# Patient Record
Sex: FEMALE | Race: WHITE | NOT HISPANIC OR LATINO | Employment: PART TIME | URBAN - METROPOLITAN AREA
[De-identification: names, ages, dates, MRNs, and addresses within clinical notes are randomized per-mention and may not be internally consistent; named-entity substitution may affect disease eponyms.]

---

## 2022-10-18 ENCOUNTER — OFFICE VISIT (OUTPATIENT)
Dept: UROLOGY | Facility: CLINIC | Age: 77
End: 2022-10-18
Payer: MEDICARE

## 2022-10-18 VITALS
SYSTOLIC BLOOD PRESSURE: 120 MMHG | HEIGHT: 62 IN | WEIGHT: 142.44 LBS | BODY MASS INDEX: 26.21 KG/M2 | DIASTOLIC BLOOD PRESSURE: 70 MMHG

## 2022-10-18 DIAGNOSIS — N95.8 GENITOURINARY SYNDROME OF MENOPAUSE: ICD-10-CM

## 2022-10-18 DIAGNOSIS — N81.10 BADEN-WALKER GRADE 2 CYSTOCELE: ICD-10-CM

## 2022-10-18 DIAGNOSIS — R33.9 URINARY RETENTION: ICD-10-CM

## 2022-10-18 DIAGNOSIS — N39.0 RECURRENT UTI: Primary | ICD-10-CM

## 2022-10-18 PROCEDURE — 99204 PR OFFICE/OUTPT VISIT, NEW, LEVL IV, 45-59 MIN: ICD-10-PCS | Mod: S$GLB,,, | Performed by: UROLOGY

## 2022-10-18 PROCEDURE — 1159F PR MEDICATION LIST DOCUMENTED IN MEDICAL RECORD: ICD-10-PCS | Mod: CPTII,S$GLB,, | Performed by: UROLOGY

## 2022-10-18 PROCEDURE — 3074F PR MOST RECENT SYSTOLIC BLOOD PRESSURE < 130 MM HG: ICD-10-PCS | Mod: CPTII,S$GLB,, | Performed by: UROLOGY

## 2022-10-18 PROCEDURE — 3288F FALL RISK ASSESSMENT DOCD: CPT | Mod: CPTII,S$GLB,, | Performed by: UROLOGY

## 2022-10-18 PROCEDURE — 87086 URINE CULTURE/COLONY COUNT: CPT | Performed by: UROLOGY

## 2022-10-18 PROCEDURE — 87077 CULTURE AEROBIC IDENTIFY: CPT | Performed by: UROLOGY

## 2022-10-18 PROCEDURE — 99999 PR PBB SHADOW E&M-EST. PATIENT-LVL III: CPT | Mod: PBBFAC,,, | Performed by: UROLOGY

## 2022-10-18 PROCEDURE — 3074F SYST BP LT 130 MM HG: CPT | Mod: CPTII,S$GLB,, | Performed by: UROLOGY

## 2022-10-18 PROCEDURE — 3288F PR FALLS RISK ASSESSMENT DOCUMENTED: ICD-10-PCS | Mod: CPTII,S$GLB,, | Performed by: UROLOGY

## 2022-10-18 PROCEDURE — 1101F PT FALLS ASSESS-DOCD LE1/YR: CPT | Mod: CPTII,S$GLB,, | Performed by: UROLOGY

## 2022-10-18 PROCEDURE — 99999 PR PBB SHADOW E&M-EST. PATIENT-LVL III: ICD-10-PCS | Mod: PBBFAC,,, | Performed by: UROLOGY

## 2022-10-18 PROCEDURE — 51798 US URINE CAPACITY MEASURE: CPT | Mod: S$GLB,,, | Performed by: UROLOGY

## 2022-10-18 PROCEDURE — 99204 OFFICE O/P NEW MOD 45 MIN: CPT | Mod: S$GLB,,, | Performed by: UROLOGY

## 2022-10-18 PROCEDURE — 1159F MED LIST DOCD IN RCRD: CPT | Mod: CPTII,S$GLB,, | Performed by: UROLOGY

## 2022-10-18 PROCEDURE — 1101F PR PT FALLS ASSESS DOC 0-1 FALLS W/OUT INJ PAST YR: ICD-10-PCS | Mod: CPTII,S$GLB,, | Performed by: UROLOGY

## 2022-10-18 PROCEDURE — 4010F ACE/ARB THERAPY RXD/TAKEN: CPT | Mod: CPTII,S$GLB,, | Performed by: UROLOGY

## 2022-10-18 PROCEDURE — 1126F AMNT PAIN NOTED NONE PRSNT: CPT | Mod: CPTII,S$GLB,, | Performed by: UROLOGY

## 2022-10-18 PROCEDURE — 87186 SC STD MICRODIL/AGAR DIL: CPT | Performed by: UROLOGY

## 2022-10-18 PROCEDURE — 3078F PR MOST RECENT DIASTOLIC BLOOD PRESSURE < 80 MM HG: ICD-10-PCS | Mod: CPTII,S$GLB,, | Performed by: UROLOGY

## 2022-10-18 PROCEDURE — 87088 URINE BACTERIA CULTURE: CPT | Performed by: UROLOGY

## 2022-10-18 PROCEDURE — 1126F PR PAIN SEVERITY QUANTIFIED, NO PAIN PRESENT: ICD-10-PCS | Mod: CPTII,S$GLB,, | Performed by: UROLOGY

## 2022-10-18 PROCEDURE — 4010F PR ACE/ARB THEARPY RXD/TAKEN: ICD-10-PCS | Mod: CPTII,S$GLB,, | Performed by: UROLOGY

## 2022-10-18 PROCEDURE — 51798 PR MEAS,POST-VOID RES,US,NON-IMAGING: ICD-10-PCS | Mod: S$GLB,,, | Performed by: UROLOGY

## 2022-10-18 PROCEDURE — 3078F DIAST BP <80 MM HG: CPT | Mod: CPTII,S$GLB,, | Performed by: UROLOGY

## 2022-10-18 RX ORDER — UBIDECARENONE 30 MG
30 CAPSULE ORAL 3 TIMES DAILY
COMMUNITY

## 2022-10-18 RX ORDER — GABAPENTIN 100 MG/1
100 CAPSULE ORAL
COMMUNITY
Start: 2022-09-30

## 2022-10-18 RX ORDER — LISINOPRIL 10 MG/1
1 TABLET ORAL DAILY
COMMUNITY
Start: 2022-09-29

## 2022-10-18 RX ORDER — LANOLIN ALCOHOL/MO/W.PET/CERES
1 CREAM (GRAM) TOPICAL DAILY
COMMUNITY
Start: 2022-05-05 | End: 2023-05-05

## 2022-10-18 RX ORDER — EZETIMIBE 10 MG/1
10 TABLET ORAL DAILY
COMMUNITY

## 2022-10-18 RX ORDER — SPIRONOLACTONE 100 MG/1
100 TABLET, FILM COATED ORAL
COMMUNITY

## 2022-10-18 RX ORDER — ASPIRIN 81 MG/1
81 TABLET ORAL
COMMUNITY

## 2022-10-18 RX ORDER — NITROFURANTOIN 25; 75 MG/1; MG/1
100 CAPSULE ORAL 2 TIMES DAILY
Qty: 14 CAPSULE | Refills: 0 | Status: SHIPPED | OUTPATIENT
Start: 2022-10-18

## 2022-10-18 NOTE — PROGRESS NOTES
Chief Complaint   Patient presents with    Other     New patient/recurrent uti's       History of Present Illness:   Tresa Dodd is a 76 y.o. female here for evaluation of Other (New patient/recurrent uti's)    She reports that she had a bladder lift years ago, vaginal approach. Unsure if mesh was involved.  When she sits in a car and has the urge to urinate, she reports it is painful. Nocturia Q45 minutes. 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 She reports feelings of pelvic pressure and knows that she has prolapse.   Has frequent UTIs. Urine is more cloudy lately.   Enterococcus on 2 cultures over the past 6 months.   Feels like she empties, but has frequency.   No dysuria. Reports one episode of gross hematuria, associated with a UTI 3 months ago. None otherwise.   She is sexually active.     Review of Systems   Constitutional:  Negative for chills and fever.   Respiratory:  Negative for shortness of breath.    Cardiovascular:  Negative for chest pain.   Genitourinary:  Negative for vaginal bleeding, vaginal discharge and vaginal pain.   All other systems reviewed and are negative.      Past Medical History:   Diagnosis Date    Hypercholesteremia        Past Surgical History:   Procedure Laterality Date    ANTERIOR VAGINAL REPAIR      HAND SURGERY      TONSILLECTOMY      TOTAL ABDOMINAL HYSTERECTOMY      Pelvic Prolapse    TUBAL LIGATION         Family History   Problem Relation Age of Onset    Diabetes Mother     Diabetes Brother        Social History     Tobacco Use    Smoking status: Never    Smokeless tobacco: Never       Current Outpatient Medications   Medication Sig Dispense Refill    aspirin (ECOTRIN) 81 MG EC tablet Take 81 mg by mouth.      calcium carbonate (CALCIUM 500 ORAL) Take 1 tablet by mouth once daily.      co-enzyme Q-10 30 mg capsule Take 30 mg by mouth 3 (three) times daily.      ezetimibe (ZETIA) 10 mg tablet Take 10 mg by mouth once daily.      gabapentin (NEURONTIN) 100 MG  capsule Take 100 mg by mouth.      lisinopriL 10 MG tablet Take 1 tablet by mouth once daily.      magnesium oxide (MAG-OX) 400 mg (241.3 mg magnesium) tablet Take 1 tablet by mouth once daily.      multivitamin capsule Take 1 capsule by mouth once daily.      spironolactone (ALDACTONE) 100 MG tablet Take 100 mg by mouth.      conjugated estrogens (PREMARIN) vaginal cream Apply a pea sized amount PV QHS x 2 weeks. Then, apply 3 times a week. 30 g 3    nitrofurantoin, macrocrystal-monohydrate, (MACROBID) 100 MG capsule Take 1 capsule (100 mg total) by mouth 2 (two) times daily. 14 capsule 0     No current facility-administered medications for this visit.       Review of patient's allergies indicates:  No Known Allergies    Physical Exam  Vitals:    10/18/22 0840   BP: 120/70     General: Well-developed, well-nourished, in no acute distress  HEENT: Normocephalic, atraumatic, extraocular movements intact  Neck: Supple, no supraclavicular or cervical lymphadenopathy, trachea midline  Respirations: even and unlabored  Back: midline spine, No CVA tenderness  Abdomen: soft, Non-tender, non-distended, no palpable masses, no rebound or guarding  : Normal external female genitalia without lesions. Orthotopic urethral meatus with caruncle. atrophic vaginal mucosa. Grade 2 Cysotcele, Grade 1 Rectocele, Grade 2 apical prolapse, negative cough stress test, + urethral hypermobility  Extremities: moves all equally, no clubbing, cyanosis or edema  Skin: Warm and dry. No lesions  Psych: normal affect  Neuro: Alert and oriented x 3. Cranial nerves II-XII intact    PVR: 181cc 10/18/22    Urinalysis  2+LE, pos nit, 50 blood    Assessment:  1. Recurrent UTI  Urine culture    US Retroperitoneal Complete (Kidney and      2. Genitourinary syndrome of menopause        3. Urinary retention        4. [place]-Walker grade 2 cystocele              Plan:   Recurrent UTI  -     Urine culture  -     US Retroperitoneal Complete (Kidney and; Future;  Expected date: 10/18/2022    Genitourinary syndrome of menopause    Urinary retention    [place]-Walker grade 2 cystocele    Other orders  -     nitrofurantoin, macrocrystal-monohydrate, (MACROBID) 100 MG capsule; Take 1 capsule (100 mg total) by mouth 2 (two) times daily.  Dispense: 14 capsule; Refill: 0  -     conjugated estrogens (PREMARIN) vaginal cream; Apply a pea sized amount PV QHS x 2 weeks. Then, apply 3 times a week.  Dispense: 30 g; Refill: 3    Recommended double voiding  Discussed further management options of prolapse, including PFPT, pessary and surgery. Pt would like to see how she does with current prescriptions.     Follow up in about 3 months (around 1/18/2023).

## 2022-10-20 LAB — BACTERIA UR CULT: ABNORMAL

## 2022-10-24 ENCOUNTER — HOSPITAL ENCOUNTER (OUTPATIENT)
Dept: RADIOLOGY | Facility: HOSPITAL | Age: 77
Discharge: HOME | End: 2022-10-24
Attending: UROLOGY
Payer: MEDICARE

## 2022-10-24 ENCOUNTER — TELEPHONE (OUTPATIENT)
Dept: UROLOGY | Facility: CLINIC | Age: 77
End: 2022-10-24

## 2022-10-24 DIAGNOSIS — N39.0 RECURRENT UTI: ICD-10-CM

## 2022-10-24 PROCEDURE — 76770 US EXAM ABDO BACK WALL COMP: CPT | Mod: TC,PN

## 2022-10-25 NOTE — TELEPHONE ENCOUNTER
----- Message from Rosamaria Akins sent at 10/24/2022  9:46 AM CDT -----  Regarding: Advice  Contact: Patient  Patient would like a call back concerning getting a cheaper estrogen cream, the one prescribed was to expensive, and would like to set up her bladder surgery. Please call to advise at Ph .[phone] (home)

## 2022-10-25 NOTE — TELEPHONE ENCOUNTER
----- Message from Apurva Cleary MD sent at 10/24/2022 10:39 AM CDT -----  Contact: Tresa  Okay, please schedule her for an office visit closer to the time that she wants surgery.   ----- Message -----  From: Damaris Ramos MA  Sent: 10/21/2022   4:49 PM CDT  To: Apurva Cleary MD    Is she referring to the sling?    ----- Message -----  From: Karen Heard  Sent: 10/21/2022  10:33 AM CDT  To: [facility]    Tresa called in to let the office know that she has decided to do Bladder tuck and she wants to schedule sometime after the 1st of the year. Please call her at [phone]    Thanks  Td

## 2022-10-27 ENCOUNTER — TELEPHONE (OUTPATIENT)
Dept: UROLOGY | Facility: CLINIC | Age: 77
End: 2022-10-27
Payer: MEDICARE

## 2022-10-27 NOTE — TELEPHONE ENCOUNTER
----- Message from Mari Tovar sent at 10/27/2022 10:19 AM CDT -----  Contact: [phone]  Patient called in regarding her fallen bladder. She would like to schedule an appointment after the holidays for that procedure. She is also calling to consult with a nurse regarding the Estrogen. It was very pricey and would like to know if there is something more affordable, samples, or anything to help her. Please call her back at [phone]. This patient has sent 2 prior messages regarding these matters. Thanks/MARYCHUY

## 2022-10-27 NOTE — TELEPHONE ENCOUNTER
----- Message from Mari Tovar sent at 10/27/2022 10:19 AM CDT -----  Contact: [phone]  Patient called in regarding her fallen bladder. She would like to schedule an appointment after the holidays for that procedure. She is also calling to consult with a nurse regarding the Estrogen. It was very pricey and would like to know if there is something more affordable, samples, or anything to help her. Please call her back at [phone]. This patient has sent 2 prior messages regarding these matters. Thanks/MARYCHUY

## 2022-10-27 NOTE — TELEPHONE ENCOUNTER
Called and spoke with pt states she needs to get a cheaper medication due to the vaginal cream being too expensive. If not she will get the expensive medication. Pt is interested in doing the bladder tuck around the 1st of the year whenever you are available. Pt also wanting to get results of her recent u/s.

## 2022-11-04 ENCOUNTER — TELEPHONE (OUTPATIENT)
Dept: UROLOGY | Facility: CLINIC | Age: 77
End: 2022-11-04
Payer: MEDICARE

## 2022-11-04 NOTE — TELEPHONE ENCOUNTER
Discussed vaginal estrogen cream with pt, who would like to purchase premain vaginal estrogen cream.

## 2023-01-23 PROBLEM — M85.80 OSTEOPENIA: Status: ACTIVE | Noted: 2023-01-23

## 2023-01-23 PROBLEM — N39.0 RECURRENT UTI: Status: RESOLVED | Noted: 2022-10-18 | Resolved: 2023-01-23

## 2023-01-23 PROBLEM — K57.92 DIVERTICULITIS: Status: ACTIVE | Noted: 2023-01-23

## 2023-01-24 ENCOUNTER — OFFICE VISIT (OUTPATIENT)
Dept: UROLOGY | Facility: CLINIC | Age: 78
End: 2023-01-24
Payer: MEDICARE

## 2023-01-24 VITALS
BODY MASS INDEX: 26.86 KG/M2 | SYSTOLIC BLOOD PRESSURE: 110 MMHG | WEIGHT: 145.94 LBS | DIASTOLIC BLOOD PRESSURE: 55 MMHG | HEART RATE: 61 BPM | HEIGHT: 62 IN

## 2023-01-24 DIAGNOSIS — R33.9 URINARY RETENTION: Primary | ICD-10-CM

## 2023-01-24 DIAGNOSIS — N81.10 BADEN-WALKER GRADE 2 CYSTOCELE: ICD-10-CM

## 2023-01-24 DIAGNOSIS — N95.8 GENITOURINARY SYNDROME OF MENOPAUSE: ICD-10-CM

## 2023-01-24 DIAGNOSIS — N39.0 RECURRENT UTI: ICD-10-CM

## 2023-01-24 LAB
BILIRUB SERPL-MCNC: NORMAL MG/DL
BLOOD URINE, POC: NORMAL
CLARITY, POC UA: CLEAR
COLOR, POC UA: NORMAL
GLUCOSE UR QL STRIP: NORMAL
KETONES UR QL STRIP: NORMAL
LEUKOCYTE ESTERASE URINE, POC: NORMAL
NITRITE, POC UA: NORMAL
PH, POC UA: 6
PROTEIN, POC: NORMAL
SPECIFIC GRAVITY, POC UA: 1
UROBILINOGEN, POC UA: NORMAL

## 2023-01-24 PROCEDURE — 1101F PT FALLS ASSESS-DOCD LE1/YR: CPT | Mod: CPTII,S$GLB,, | Performed by: UROLOGY

## 2023-01-24 PROCEDURE — 1126F AMNT PAIN NOTED NONE PRSNT: CPT | Mod: CPTII,S$GLB,, | Performed by: UROLOGY

## 2023-01-24 PROCEDURE — 1159F MED LIST DOCD IN RCRD: CPT | Mod: CPTII,S$GLB,, | Performed by: UROLOGY

## 2023-01-24 PROCEDURE — 81002 POCT URINE DIPSTICK WITHOUT MICROSCOPE: ICD-10-PCS | Mod: S$GLB,,, | Performed by: UROLOGY

## 2023-01-24 PROCEDURE — 3288F PR FALLS RISK ASSESSMENT DOCUMENTED: ICD-10-PCS | Mod: CPTII,S$GLB,, | Performed by: UROLOGY

## 2023-01-24 PROCEDURE — 3074F SYST BP LT 130 MM HG: CPT | Mod: CPTII,S$GLB,, | Performed by: UROLOGY

## 2023-01-24 PROCEDURE — 3078F PR MOST RECENT DIASTOLIC BLOOD PRESSURE < 80 MM HG: ICD-10-PCS | Mod: CPTII,S$GLB,, | Performed by: UROLOGY

## 2023-01-24 PROCEDURE — 99214 OFFICE O/P EST MOD 30 MIN: CPT | Mod: S$GLB,,, | Performed by: UROLOGY

## 2023-01-24 PROCEDURE — 99214 PR OFFICE/OUTPT VISIT, EST, LEVL IV, 30-39 MIN: ICD-10-PCS | Mod: S$GLB,,, | Performed by: UROLOGY

## 2023-01-24 PROCEDURE — 3288F FALL RISK ASSESSMENT DOCD: CPT | Mod: CPTII,S$GLB,, | Performed by: UROLOGY

## 2023-01-24 PROCEDURE — 3074F PR MOST RECENT SYSTOLIC BLOOD PRESSURE < 130 MM HG: ICD-10-PCS | Mod: CPTII,S$GLB,, | Performed by: UROLOGY

## 2023-01-24 PROCEDURE — 1159F PR MEDICATION LIST DOCUMENTED IN MEDICAL RECORD: ICD-10-PCS | Mod: CPTII,S$GLB,, | Performed by: UROLOGY

## 2023-01-24 PROCEDURE — 3078F DIAST BP <80 MM HG: CPT | Mod: CPTII,S$GLB,, | Performed by: UROLOGY

## 2023-01-24 PROCEDURE — 81002 URINALYSIS NONAUTO W/O SCOPE: CPT | Mod: S$GLB,,, | Performed by: UROLOGY

## 2023-01-24 PROCEDURE — 1126F PR PAIN SEVERITY QUANTIFIED, NO PAIN PRESENT: ICD-10-PCS | Mod: CPTII,S$GLB,, | Performed by: UROLOGY

## 2023-01-24 PROCEDURE — 1101F PR PT FALLS ASSESS DOC 0-1 FALLS W/OUT INJ PAST YR: ICD-10-PCS | Mod: CPTII,S$GLB,, | Performed by: UROLOGY

## 2023-01-24 PROCEDURE — 99999 PR PBB SHADOW E&M-EST. PATIENT-LVL III: CPT | Mod: PBBFAC,,, | Performed by: UROLOGY

## 2023-01-24 PROCEDURE — 99999 PR PBB SHADOW E&M-EST. PATIENT-LVL III: ICD-10-PCS | Mod: PBBFAC,,, | Performed by: UROLOGY

## 2023-01-24 RX ORDER — METHOCARBAMOL 750 MG/1
750 TABLET, FILM COATED ORAL EVERY 8 HOURS PRN
COMMUNITY
Start: 2023-01-24

## 2023-01-24 NOTE — PROGRESS NOTES
CC: follow up Recurrent UTI    History of Present Illness:   Tresa Dodd is a 77 y.o. female here for follow up.     1/24/23-using vaginal estrogen cream 3 times a week. Renal US 10/24 showed no solid mass, stone or hydronephrosis. Prolapse isn't bothering her currently. No pain. She reports that there was one instance of feeling like she was getting a UTI and took azo and it resolved. Her typical symptom is pelvic pain. None currently. No gross hematuria.     10/18/22-She reports that she had a bladder lift years ago, vaginal approach. Unsure if mesh was involved.  When she sits in a car and has the urge to urinate, she reports it is painful. Nocturia Q45 minutes. 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 She reports feelings of pelvic pressure and knows that she has prolapse.   Has frequent UTIs. Urine is more cloudy lately.   Enterococcus on 2 cultures over the past 6 months.   Feels like she empties, but has frequency.   No dysuria. Reports one episode of gross hematuria, associated with a UTI 3 months ago. None otherwise.   She is sexually active.     Review of Systems   Constitutional:  Negative for chills and fever.   Respiratory:  Negative for shortness of breath.    Cardiovascular:  Negative for chest pain.   Genitourinary:  Negative for vaginal bleeding, vaginal discharge and vaginal pain.   All other systems reviewed and are negative.      Past Medical History:   Diagnosis Date    Hypercholesteremia     Osteopenia        Past Surgical History:   Procedure Laterality Date    ANTERIOR VAGINAL REPAIR      HAND SURGERY      TONSILLECTOMY      TOTAL ABDOMINAL HYSTERECTOMY      Pelvic Prolapse    TUBAL LIGATION         Family History   Problem Relation Age of Onset    Diabetes Mother     Diabetes Brother        Social History     Tobacco Use    Smoking status: Never    Smokeless tobacco: Never       Current Outpatient Medications   Medication Sig Dispense Refill    aspirin (ECOTRIN) 81 MG EC tablet Take 81  mg by mouth.      calcium carbonate (CALCIUM 500 ORAL) Take 1 tablet by mouth once daily.      conjugated estrogens (PREMARIN) vaginal cream Apply a pea sized amount PV QHS x 2 weeks. Then, apply 3 times a week. 30 g 3    gabapentin (NEURONTIN) 100 MG capsule Take 100 mg by mouth.      lisinopriL 10 MG tablet Take 1 tablet by mouth once daily.      magnesium oxide (MAG-OX) 400 mg (241.3 mg magnesium) tablet Take 1 tablet by mouth once daily.      methocarbamoL (ROBAXIN) 750 MG Tab Take 750 mg by mouth every 8 (eight) hours as needed.      multivitamin capsule Take 1 capsule by mouth once daily.      spironolactone (ALDACTONE) 100 MG tablet Take 100 mg by mouth.      co-enzyme Q-10 30 mg capsule Take 30 mg by mouth 3 (three) times daily.      ezetimibe (ZETIA) 10 mg tablet Take 10 mg by mouth once daily.      nitrofurantoin, macrocrystal-monohydrate, (MACROBID) 100 MG capsule Take 1 capsule (100 mg total) by mouth 2 (two) times daily. (Patient not taking: Reported on 1/24/2023) 14 capsule 0     No current facility-administered medications for this visit.       Review of patient's allergies indicates:  No Known Allergies    Physical Exam  Vitals:    01/24/23 1338   BP: (!) 110/55   Pulse: 61     General: Well-developed, well-nourished, in no acute distress  HEENT: Normocephalic, atraumatic, extraocular movements intact  Neck: Supple, no supraclavicular or cervical lymphadenopathy, trachea midline  Respirations: even and unlabored  Back: midline spine, No CVA tenderness  Abdomen: soft, Non-tender, non-distended, no palpable masses, no rebound or guarding  : 10/18/22- Normal external female genitalia without lesions. Orthotopic urethral meatus with caruncle. atrophic vaginal mucosa. Grade 2 Cysotcele, Grade 1 Rectocele, Grade 2 apical prolapse, negative cough stress test, + urethral hypermobility  Extremities: moves all equally, no clubbing, cyanosis or edema  Skin: Warm and dry. No lesions  Psych: normal  affect  Neuro: Alert and oriented x 3. Cranial nerves II-XII intact    PVR: 181cc 10/18/22    Urinalysis  Negative for blood, LE, nit    Assessment:  1. Urinary retention  POCT URINE DIPSTICK WITHOUT MICROSCOPE      2. Recurrent UTI  POCT URINE DIPSTICK WITHOUT MICROSCOPE      3. [place]-Walker grade 2 cystocele        4. Genitourinary syndrome of menopause                Plan:   Urinary retention  -     POCT URINE DIPSTICK WITHOUT MICROSCOPE    Recurrent UTI  -     POCT URINE DIPSTICK WITHOUT MICROSCOPE    [place]-Walker grade 2 cystocele    Genitourinary syndrome of menopause      Patient is fairly comfortable with continued observation of cystocele. Continue vaginal estrogen cream.   Continue double voiding.       Follow up in about 9 months (around 10/24/2023).

## 2023-11-07 ENCOUNTER — OFFICE VISIT (OUTPATIENT)
Dept: UROLOGY | Facility: CLINIC | Age: 78
End: 2023-11-07
Payer: MEDICARE

## 2023-11-07 VITALS
RESPIRATION RATE: 14 BRPM | HEART RATE: 74 BPM | SYSTOLIC BLOOD PRESSURE: 102 MMHG | BODY MASS INDEX: 26.01 KG/M2 | DIASTOLIC BLOOD PRESSURE: 66 MMHG | WEIGHT: 141.31 LBS | TEMPERATURE: 98 F | HEIGHT: 62 IN

## 2023-11-07 DIAGNOSIS — R31.29 MICROHEMATURIA: ICD-10-CM

## 2023-11-07 DIAGNOSIS — N95.8 GENITOURINARY SYNDROME OF MENOPAUSE: Primary | ICD-10-CM

## 2023-11-07 DIAGNOSIS — N39.0 RECURRENT UTI: ICD-10-CM

## 2023-11-07 DIAGNOSIS — N81.10 BADEN-WALKER GRADE 2 CYSTOCELE: ICD-10-CM

## 2023-11-07 LAB
BACTERIA #/AREA URNS AUTO: NORMAL /HPF
BILIRUB UR QL STRIP: NEGATIVE
GLUCOSE UR QL STRIP: NEGATIVE
KETONES UR QL STRIP: NEGATIVE
LEUKOCYTE ESTERASE UR QL STRIP: NEGATIVE
MICROSCOPIC COMMENT: NORMAL
PH, POC UA: 6
POC BLOOD, URINE: NEGATIVE
POC NITRATES, URINE: NEGATIVE
POC RESIDUAL URINE VOLUME: 0 ML (ref 0–100)
PROT UR QL STRIP: NEGATIVE
RBC #/AREA URNS AUTO: 0 /HPF (ref 0–4)
SP GR UR STRIP: 1.01 (ref 1–1.03)
SQUAMOUS #/AREA URNS AUTO: 4 /HPF
UROBILINOGEN UR STRIP-ACNC: 0.2 (ref 0.1–1.1)
WBC #/AREA URNS AUTO: 1 /HPF (ref 0–5)

## 2023-11-07 PROCEDURE — 3074F SYST BP LT 130 MM HG: CPT | Mod: CPTII,S$GLB,, | Performed by: UROLOGY

## 2023-11-07 PROCEDURE — 51798 POCT BLADDER SCAN: ICD-10-PCS | Mod: S$GLB,,, | Performed by: UROLOGY

## 2023-11-07 PROCEDURE — 1159F PR MEDICATION LIST DOCUMENTED IN MEDICAL RECORD: ICD-10-PCS | Mod: CPTII,S$GLB,, | Performed by: UROLOGY

## 2023-11-07 PROCEDURE — 3044F HG A1C LEVEL LT 7.0%: CPT | Mod: CPTII,S$GLB,, | Performed by: UROLOGY

## 2023-11-07 PROCEDURE — 87086 URINE CULTURE/COLONY COUNT: CPT | Performed by: UROLOGY

## 2023-11-07 PROCEDURE — 3078F PR MOST RECENT DIASTOLIC BLOOD PRESSURE < 80 MM HG: ICD-10-PCS | Mod: CPTII,S$GLB,, | Performed by: UROLOGY

## 2023-11-07 PROCEDURE — 1126F PR PAIN SEVERITY QUANTIFIED, NO PAIN PRESENT: ICD-10-PCS | Mod: CPTII,S$GLB,, | Performed by: UROLOGY

## 2023-11-07 PROCEDURE — 1159F MED LIST DOCD IN RCRD: CPT | Mod: CPTII,S$GLB,, | Performed by: UROLOGY

## 2023-11-07 PROCEDURE — 99214 OFFICE O/P EST MOD 30 MIN: CPT | Mod: S$GLB,,, | Performed by: UROLOGY

## 2023-11-07 PROCEDURE — 99214 PR OFFICE/OUTPT VISIT, EST, LEVL IV, 30-39 MIN: ICD-10-PCS | Mod: S$GLB,,, | Performed by: UROLOGY

## 2023-11-07 PROCEDURE — 99999 PR PBB SHADOW E&M-EST. PATIENT-LVL IV: CPT | Mod: PBBFAC,,, | Performed by: UROLOGY

## 2023-11-07 PROCEDURE — 51798 US URINE CAPACITY MEASURE: CPT | Mod: S$GLB,,, | Performed by: UROLOGY

## 2023-11-07 PROCEDURE — 81003 URINALYSIS AUTO W/O SCOPE: CPT | Mod: QW,S$GLB,, | Performed by: UROLOGY

## 2023-11-07 PROCEDURE — 1126F AMNT PAIN NOTED NONE PRSNT: CPT | Mod: CPTII,S$GLB,, | Performed by: UROLOGY

## 2023-11-07 PROCEDURE — 3044F PR MOST RECENT HEMOGLOBIN A1C LEVEL <7.0%: ICD-10-PCS | Mod: CPTII,S$GLB,, | Performed by: UROLOGY

## 2023-11-07 PROCEDURE — 3074F PR MOST RECENT SYSTOLIC BLOOD PRESSURE < 130 MM HG: ICD-10-PCS | Mod: CPTII,S$GLB,, | Performed by: UROLOGY

## 2023-11-07 PROCEDURE — 99999 PR PBB SHADOW E&M-EST. PATIENT-LVL IV: ICD-10-PCS | Mod: PBBFAC,,, | Performed by: UROLOGY

## 2023-11-07 PROCEDURE — 3078F DIAST BP <80 MM HG: CPT | Mod: CPTII,S$GLB,, | Performed by: UROLOGY

## 2023-11-07 PROCEDURE — 81003 POCT URINALYSIS, DIPSTICK, AUTOMATED, W/O SCOPE: ICD-10-PCS | Mod: QW,S$GLB,, | Performed by: UROLOGY

## 2023-11-07 PROCEDURE — 81001 URINALYSIS AUTO W/SCOPE: CPT | Performed by: UROLOGY

## 2023-11-07 NOTE — PROGRESS NOTES
CC: follow up Recurrent UTI    History of Present Illness:   Tresa Dodd is a 77 y.o. female here for follow up.     11/7/23-she has run out of vaginal estrogen cream. Pt interested in a [facility] pharmacy. She has had 2 UTIs in the past 9 months. Treated by her PCP. No feelings of vagnal bulge. No incontinence. NO gross hematuria. Nocturia x 2. She drinks into the evening.     1/24/23-using vaginal estrogen cream 3 times a week. Renal US 10/24 showed no solid mass, stone or hydronephrosis. Prolapse isn't bothering her currently. No pain. She reports that there was one instance of feeling like she was getting a UTI and took azo and it resolved. Her typical symptom is pelvic pain. None currently. No gross hematuria.     10/18/22-She reports that she had a bladder lift years ago, vaginal approach. Unsure if mesh was involved.  When she sits in a car and has the urge to urinate, she reports it is painful. Nocturia Q45 minutes. 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 She reports feelings of pelvic pressure and knows that she has prolapse.   Has frequent UTIs. Urine is more cloudy lately.   Enterococcus on 2 cultures over the past 6 months.   Feels like she empties, but has frequency.   No dysuria. Reports one episode of gross hematuria, associated with a UTI 3 months ago. None otherwise.   She is sexually active.     Review of Systems   Constitutional:  Negative for chills and fever.   Respiratory:  Negative for shortness of breath.    Cardiovascular:  Negative for chest pain.   Genitourinary:  Negative for vaginal bleeding, vaginal discharge and vaginal pain.   All other systems reviewed and are negative.        Past Medical History:   Diagnosis Date    Hypercholesteremia     Osteopenia        Past Surgical History:   Procedure Laterality Date    ANTERIOR VAGINAL REPAIR      HAND SURGERY      TONSILLECTOMY      TOTAL ABDOMINAL HYSTERECTOMY      Pelvic Prolapse    TUBAL LIGATION         Family History   Problem  Relation Age of Onset    Diabetes Mother     Diabetes Brother        Social History     Tobacco Use    Smoking status: Never    Smokeless tobacco: Never       Current Outpatient Medications   Medication Sig Dispense Refill    aspirin (ECOTRIN) 81 MG EC tablet Take 81 mg by mouth.      calcium carbonate (CALCIUM 500 ORAL) Take 1 tablet by mouth once daily.      co-enzyme Q-10 30 mg capsule Take 30 mg by mouth 3 (three) times daily.      conjugated estrogens (PREMARIN) vaginal cream Apply a pea sized amount PV QHS x 2 weeks. Then, apply 3 times a week. 30 g 3    ezetimibe (ZETIA) 10 mg tablet Take 10 mg by mouth once daily.      gabapentin (NEURONTIN) 100 MG capsule Take 100 mg by mouth.      lisinopriL 10 MG tablet Take 1 tablet by mouth once daily.      methocarbamoL (ROBAXIN) 750 MG Tab Take 750 mg by mouth every 8 (eight) hours as needed.      multivitamin capsule Take 1 capsule by mouth once daily.      nitrofurantoin, macrocrystal-monohydrate, (MACROBID) 100 MG capsule Take 1 capsule (100 mg total) by mouth 2 (two) times daily. (Patient not taking: Reported on 1/24/2023) 14 capsule 0    spironolactone (ALDACTONE) 100 MG tablet Take 100 mg by mouth.       No current facility-administered medications for this visit.       Review of patient's allergies indicates:  No Known Allergies    Physical Exam  Vitals:    11/07/23 0958   BP: 102/66   Pulse: 74   Resp: 14   Temp: 98.2 °F (36.8 °C)       General: Well-developed, well-nourished, in no acute distress  HEENT: Normocephalic, atraumatic, extraocular movements intact  Neck: Supple, no supraclavicular or cervical lymphadenopathy, trachea midline  Respirations: even and unlabored  Back: midline spine, No CVA tenderness  Abdomen: soft, Non-tender, non-distended, no palpable masses, no rebound or guarding  : 10/18/22- Normal external female genitalia without lesions. Orthotopic urethral meatus with caruncle. atrophic vaginal mucosa. Grade 2 Cysotcele, Grade 1  Rectocele, Grade 2 apical prolapse, negative cough stress test, + urethral hypermobility  Extremities: moves all equally, no clubbing, cyanosis or edema  Skin: Warm and dry. No lesions  Psych: normal affect  Neuro: Alert and oriented x 3. Cranial nerves II-XII intact    PVR:   181cc 10/18/22  0cc 11/7/23    UA: Trace blood, otherwise negative    Assessment:  1. Genitourinary syndrome of menopause        2. [place]-Walker grade 2 cystocele        3. Recurrent UTI  CULTURE, URINE      4. Microhematuria  POCT Bladder Scan    POCT Urinalysis, Dipstick, Automated, W/O Scope    Urinalysis Microscopic                Plan:   Genitourinary syndrome of menopause    [place]-Walker grade 2 cystocele    Recurrent UTI  -     CULTURE, URINE    Microhematuria  -     POCT Bladder Scan  -     POCT Urinalysis, Dipstick, Automated, W/O Scope  -     Urinalysis Microscopic        Patient is comfortable with continued observation of cystocele. Continue vaginal estrogen cream. [facility]  Continue double voiding.       Follow up in about 9 months (around 8/7/2024).

## 2023-11-08 LAB — BACTERIA UR CULT: NO GROWTH

## 2023-11-10 ENCOUNTER — TELEPHONE (OUTPATIENT)
Dept: UROLOGY | Facility: CLINIC | Age: 78
End: 2023-11-10
Payer: MEDICARE

## 2023-11-10 NOTE — TELEPHONE ENCOUNTER
LVM for patient that per Dr. Cleary the medications are very similar and are used the same way.    Aranza Rod LPN      ----- Message from Apurva Cleary MD sent at 11/10/2023 12:56 PM CST -----  Contact: MARY MUNOZ [[id number]]  Yes, very similar. They are used the same way.  ----- Message -----  From: Aranza Rod LPN  Sent: 11/10/2023  11:11 AM CST  To: Apurva Cleary MD    I know you're out of town. I'm sorry are these the same things  ----- Message -----  From: Tamika Heard  Sent: 11/9/2023  12:31 PM CST  To: [facility] Staff    ..Type:  Patient Requesting Call    Who Called: ALEXANDERMARY [[id number]]  Does the patient know what this is regarding?: conjugated estrogens (PREMARIN) vaginal cream and compound medication. Pt wants to know if they are the same thing  Would the patient rather a call back or a response via [facility]?  call  Best Call Back Number: .[phone] (home)   Additional Information:

## 2024-06-28 ENCOUNTER — TELEPHONE (OUTPATIENT)
Dept: UROLOGY | Facility: CLINIC | Age: 79
End: 2024-06-28
Payer: MEDICARE

## 2024-06-28 NOTE — TELEPHONE ENCOUNTER
.Outgoing call placed to patient, patient verified name and date of birth, patient notified of Dr. Cleary being out of office on her next scheduled visit and need to reschedule, she verbalized understanding and appt rescheduled as requested.

## 2025-02-25 ENCOUNTER — OFFICE VISIT (OUTPATIENT)
Dept: UROLOGY | Facility: CLINIC | Age: 80
End: 2025-02-25
Payer: MEDICARE

## 2025-02-25 VITALS
RESPIRATION RATE: 18 BRPM | HEIGHT: 62 IN | DIASTOLIC BLOOD PRESSURE: 78 MMHG | BODY MASS INDEX: 26.21 KG/M2 | WEIGHT: 142.44 LBS | SYSTOLIC BLOOD PRESSURE: 151 MMHG | HEART RATE: 69 BPM

## 2025-02-25 DIAGNOSIS — N81.10 BADEN-WALKER GRADE 3 CYSTOCELE: Primary | ICD-10-CM

## 2025-02-25 DIAGNOSIS — N95.8 GENITOURINARY SYNDROME OF MENOPAUSE: ICD-10-CM

## 2025-02-25 LAB
BILIRUB UR QL STRIP: NEGATIVE
GLUCOSE UR QL STRIP: NEGATIVE
KETONES UR QL STRIP: NEGATIVE
LEUKOCYTE ESTERASE UR QL STRIP: POSITIVE
PH, POC UA: 5
POC BLOOD, URINE: NEGATIVE
POC NITRATES, URINE: NEGATIVE
PROT UR QL STRIP: NEGATIVE
SP GR UR STRIP: 1 (ref 1–1.03)
UROBILINOGEN UR STRIP-ACNC: 0.2 (ref 0.1–1.1)

## 2025-02-25 PROCEDURE — 99213 OFFICE O/P EST LOW 20 MIN: CPT | Mod: S$GLB,,, | Performed by: UROLOGY

## 2025-02-25 PROCEDURE — 99999 PR PBB SHADOW E&M-EST. PATIENT-LVL III: CPT | Mod: PBBFAC,,, | Performed by: UROLOGY

## 2025-02-25 PROCEDURE — 81003 URINALYSIS AUTO W/O SCOPE: CPT | Mod: QW,S$GLB,, | Performed by: UROLOGY

## 2025-02-25 PROCEDURE — 1159F MED LIST DOCD IN RCRD: CPT | Mod: CPTII,S$GLB,, | Performed by: UROLOGY

## 2025-02-25 PROCEDURE — 3077F SYST BP >= 140 MM HG: CPT | Mod: CPTII,S$GLB,, | Performed by: UROLOGY

## 2025-02-25 PROCEDURE — 1160F RVW MEDS BY RX/DR IN RCRD: CPT | Mod: CPTII,S$GLB,, | Performed by: UROLOGY

## 2025-02-25 PROCEDURE — 1126F AMNT PAIN NOTED NONE PRSNT: CPT | Mod: CPTII,S$GLB,, | Performed by: UROLOGY

## 2025-02-25 PROCEDURE — 1101F PT FALLS ASSESS-DOCD LE1/YR: CPT | Mod: CPTII,S$GLB,, | Performed by: UROLOGY

## 2025-02-25 PROCEDURE — 3078F DIAST BP <80 MM HG: CPT | Mod: CPTII,S$GLB,, | Performed by: UROLOGY

## 2025-02-25 PROCEDURE — 3288F FALL RISK ASSESSMENT DOCD: CPT | Mod: CPTII,S$GLB,, | Performed by: UROLOGY

## 2025-02-25 NOTE — PROGRESS NOTES
CC: follow up Recurrent UTI    History of Present Illness:   Tresa Dodd is a 79 y.o. female here for follow up.       2/25/25-Pt not using vaginal estrogen because she is scared of it. Pt drinks cranberry juice, takes cranberry pills and double voids to empty. Pt had 1 E.coli UTI over the past year. No vaginal discomfort. No incontinence. Follows with her gyn yearly.     11/7/23-she has run out of vaginal estrogen cream. Pt interested in a [facility] pharmacy. She has had 2 UTIs in the past 9 months. Treated by her PCP. No feelings of vagnal bulge. No incontinence. NO gross hematuria. Nocturia x 2. She drinks into the evening.     1/24/23-using vaginal estrogen cream 3 times a week. Renal US 10/24 showed no solid mass, stone or hydronephrosis. Prolapse isn't bothering her currently. No pain. She reports that there was one instance of feeling like she was getting a UTI and took azo and it resolved. Her typical symptom is pelvic pain. None currently. No gross hematuria.     10/18/22-She reports that she had a bladder lift years ago, vaginal approach. Unsure if mesh was involved.  When she sits in a car and has the urge to urinate, she reports it is painful. Nocturia Q45 minutes. 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 She reports feelings of pelvic pressure and knows that she has prolapse.   Has frequent UTIs. Urine is more cloudy lately.   Enterococcus on 2 cultures over the past 6 months.   Feels like she empties, but has frequency.   No dysuria. Reports one episode of gross hematuria, associated with a UTI 3 months ago. None otherwise.   She is sexually active.     Review of Systems   Constitutional:  Negative for chills and fever.   Respiratory:  Negative for shortness of breath.    Cardiovascular:  Negative for chest pain.   Genitourinary:  Negative for vaginal bleeding, vaginal discharge and vaginal pain.   All other systems reviewed and are negative.        Past Medical History:   Diagnosis Date     Hypercholesteremia     Osteopenia        Past Surgical History:   Procedure Laterality Date    ANTERIOR VAGINAL REPAIR      HAND SURGERY      TONSILLECTOMY      TOTAL ABDOMINAL HYSTERECTOMY      Pelvic Prolapse    TUBAL LIGATION         Family History   Problem Relation Name Age of Onset    Diabetes Mother      Diabetes Brother         Social History     Tobacco Use    Smoking status: Never    Smokeless tobacco: Never       Current Outpatient Medications   Medication Sig Dispense Refill    aspirin (ECOTRIN) 81 MG EC tablet Take 81 mg by mouth.      calcium carbonate (CALCIUM 500 ORAL) Take 1 tablet by mouth once daily.      co-enzyme Q-10 30 mg capsule Take 30 mg by mouth 3 (three) times daily.      conjugated estrogens (PREMARIN) vaginal cream Apply a pea sized amount PV QHS x 2 weeks. Then, apply 3 times a week. 30 g 3    ezetimibe (ZETIA) 10 mg tablet Take 10 mg by mouth once daily.      gabapentin (NEURONTIN) 100 MG capsule Take 100 mg by mouth.      lisinopriL 10 MG tablet Take 1 tablet by mouth once daily.      methocarbamoL (ROBAXIN) 750 MG Tab Take 750 mg by mouth every 8 (eight) hours as needed.      multivitamin capsule Take 1 capsule by mouth once daily.      nitrofurantoin, macrocrystal-monohydrate, (MACROBID) 100 MG capsule Take 1 capsule (100 mg total) by mouth 2 (two) times daily. 14 capsule 0    spironolactone (ALDACTONE) 100 MG tablet Take 100 mg by mouth.       No current facility-administered medications for this visit.       Review of patient's allergies indicates:   Allergen Reactions    Atorvastatin Other (See Comments)     Muscle cramps/ spasms    Rosuvastatin Other (See Comments)     Muscle spasms/ cramps       Physical Exam  Vitals:    02/25/25 0851   BP: (!) 151/78   Pulse: 69   Resp: 18         General: Well-developed, well-nourished, in no acute distress  HEENT: Normocephalic, atraumatic, extraocular movements intact  Neck: Supple, no supraclavicular or cervical lymphadenopathy, trachea  midline  Respirations: even and unlabored  Back: midline spine, No CVA tenderness  Abdomen: soft, Non-tender, non-distended, no palpable masses, no rebound or guarding  : 2/25/25- Normal external female genitalia without lesions. Orthotopic urethral meatus with caruncle. atrophic vaginal mucosa. Grade 3 Cysotcele, Grade 1 Rectocele, Grade 2 apical prolapse, negative cough stress test, + urethral hypermobility  Extremities: moves all equally, no clubbing, cyanosis or edema  Skin: Warm and dry. No lesions  Psych: normal affect  Neuro: Alert and oriented x 3. Cranial nerves II-XII intact      UA: small LE, otherwise negative    Assessment:  1. [place]-Walker grade 3 cystocele  POCT Urinalysis, Dipstick, Automated, W/O Scope      2. Genitourinary syndrome of menopause  POCT Urinalysis, Dipstick, Automated, W/O Scope                  Plan:   [place]-Walker grade 3 cystocele  -     POCT Urinalysis, Dipstick, Automated, W/O Scope    Genitourinary syndrome of menopause  -     POCT Urinalysis, Dipstick, Automated, W/O Scope        Pt not bothered by cystocele. Only 1 UTI over the past year and pt elects against vaginal estrogen. She would like observation of her cystocele. Continue cranberry and double voiding    Follow up if symptoms worsen or fail to improve.